# Patient Record
Sex: FEMALE | Race: WHITE | NOT HISPANIC OR LATINO | Employment: OTHER | URBAN - METROPOLITAN AREA
[De-identification: names, ages, dates, MRNs, and addresses within clinical notes are randomized per-mention and may not be internally consistent; named-entity substitution may affect disease eponyms.]

---

## 2022-11-22 ENCOUNTER — TRANSCRIBE ORDERS (OUTPATIENT)
Dept: LAB | Facility: HOSPITAL | Age: 77
End: 2022-11-22

## 2022-11-22 ENCOUNTER — LAB (OUTPATIENT)
Dept: LAB | Facility: HOSPITAL | Age: 77
End: 2022-11-22

## 2022-11-22 DIAGNOSIS — H44.001 RIGHT ENDOPHTHALMIA: ICD-10-CM

## 2022-11-22 DIAGNOSIS — H44.001 RIGHT ENDOPHTHALMIA: Primary | ICD-10-CM

## 2022-11-22 PROCEDURE — 87102 FUNGUS ISOLATION CULTURE: CPT

## 2022-11-22 PROCEDURE — 87205 SMEAR GRAM STAIN: CPT

## 2022-11-22 PROCEDURE — 87206 SMEAR FLUORESCENT/ACID STAI: CPT

## 2022-11-22 PROCEDURE — 87075 CULTR BACTERIA EXCEPT BLOOD: CPT

## 2022-11-22 PROCEDURE — 87070 CULTURE OTHR SPECIMN AEROBIC: CPT

## 2022-11-24 LAB — GIE STN SPEC: NORMAL

## 2022-11-25 LAB
BACTERIA FLD CULT: NORMAL
GRAM STN SPEC: NORMAL
GRAM STN SPEC: NORMAL

## 2022-11-27 LAB — BACTERIA SPEC ANAEROBE CULT: NORMAL

## 2023-01-03 LAB — FUNGUS WND CULT: NORMAL

## 2024-05-29 PROBLEM — E55.9 VITAMIN D DEFICIENCY: Status: ACTIVE | Noted: 2024-05-29

## 2024-05-29 PROBLEM — M25.512 PAIN IN JOINT OF LEFT SHOULDER: Status: ACTIVE | Noted: 2024-05-29

## 2024-05-29 PROBLEM — M18.0 PRIMARY OSTEOARTHRITIS OF BOTH FIRST CARPOMETACARPAL JOINTS: Status: ACTIVE | Noted: 2024-05-29

## 2024-05-29 PROBLEM — R29.3 ABNORMAL POSTURE: Status: ACTIVE | Noted: 2024-05-29

## 2024-05-29 PROBLEM — M85.80 OSTEOPENIA: Status: ACTIVE | Noted: 2024-05-29

## 2024-05-29 PROBLEM — M19.041 PRIMARY OSTEOARTHRITIS OF BOTH HANDS: Status: ACTIVE | Noted: 2024-05-29

## 2024-05-29 PROBLEM — M17.0 PRIMARY OSTEOARTHRITIS OF BOTH KNEES: Status: ACTIVE | Noted: 2024-05-29

## 2024-05-29 PROBLEM — M19.042 PRIMARY OSTEOARTHRITIS OF BOTH HANDS: Status: ACTIVE | Noted: 2024-05-29

## 2024-05-29 NOTE — PROGRESS NOTES
Office Follow Up      Date: 06/03/2024   Patient Name: Wanda Garner  MRN: [id number]  YOB: 1945    Referring Physician: Raudel Jin MD     Chief Complaint: Osteoarthritis      History of Present Illness: Wanda Garner is a 78 y.o. female who is here today for follow up on osteoarthritis.  Overall doing well. Knees are doing well. Pain is low grade, 2/10.  Little shoulder pain. PT and exercise helped.  Mild loss of ROM on R.   Hands are painless but deformities are still slowly worsening.  Non drug holiday from [facility].  Location of the pain is bilateral shoulder, bilateral hand and bilateral knee.  The patient describes it as stiff, tender, dull and achy.  It occurs intermittently.  The problem is stable.  Symptom is aggravated by bending, using hands, grasping objects and weather.  Relieving factors include OTC Meds.  Associated symptoms include gelling phenomenon.  Pertinent negatives include claudication, diarrhea, dyspnea, fatigue, fever, incontinence (fecal), incontinence (urinary), nocturnal pain, rash, weakness and weight loss.    Additional information: No longer triggering of R 4th digit.    Subjective   Review of Systems: Review of Systems   Constitutional:  Negative for chills, fatigue, fever and unexpected weight loss.   HENT:  Negative for dental problem, mouth sores, sinus pressure and swollen glands.    Eyes:  Negative for photophobia, pain and redness.   Respiratory:  Negative for apnea, cough, chest tightness and shortness of breath.    Cardiovascular:  Negative for chest pain and leg swelling.   Gastrointestinal:  Negative for abdominal pain, diarrhea, nausea and GERD.   Genitourinary:  Negative for dysuria and hematuria.   Skin:  Negative for dry skin, rash, skin lesions and bruise.   Neurological:  Negative for dizziness, seizures, syncope, weakness, headache and memory problem.   Hematological:  Negative for adenopathy. Does not bruise/bleed easily.    Psychiatric/Behavioral:  Negative for sleep disturbance, suicidal ideas, depressed mood and stress. The patient is not nervous/anxious.    All other systems reviewed and are negative.       Medications:   Current Outpatient Medications:     busPIRone (BUSPAR) 10 MG tablet, Take 2 tablets by mouth Daily., Disp: , Rfl:     calcium carbonate (OS-SELINA) 600 MG tablet, Take 1 tablet by mouth Daily., Disp: , Rfl:     Cholecalciferol (Vitamin D3 Super Strength) 50 MCG (2000 UT) capsule, Take 1 capsule by mouth Daily., Disp: , Rfl:     cycloSPORINE (RESTASIS) 0.05 % ophthalmic emulsion, Administer 1 drop to both eyes Every 12 (Twelve) Hours., Disp: , Rfl:     Iron-Vit C-Vit B12-Folic Acid (IRON 100 PLUS PO), Take 1 tablet by mouth Daily., Disp: , Rfl:     LORazepam (ATIVAN) 0.5 MG tablet, Take 0.5 tablets by mouth Daily As Needed for Anxiety., Disp: , Rfl:     mirtazapine (REMERON) 30 MG tablet, Take 1 tablet by mouth Every Night., Disp: , Rfl:     Multiple Vitamins-Minerals (PRESERVISION AREDS 2 PO), Take 1 tablet by mouth 2 (Two) Times a Day., Disp: , Rfl:     multivitamin with minerals (MULTIVITAMIN ADULT PO), Take 1 tablet by mouth Daily., Disp: , Rfl:     Omega-3 Fatty Acids (fish oil) 1000 MG capsule capsule, Take 1 capsule by mouth Daily With Breakfast., Disp: , Rfl:     clonazePAM (KlonoPIN) 0.5 MG tablet, , Disp: , Rfl:     erythromycin (ROMYCIN) 5 MG/GM ophthalmic ointment, Administer  to both eyes Every 4 (Four) Hours While Awake., Disp: 3.5 g, Rfl: 0    simvastatin (ZOCOR) 20 MG tablet, Take 1 tablet by mouth Every Night., Disp: , Rfl:     Allergies:   Allergies   Allergen Reactions    Ciprofloxacin Rash    Sulfa Antibiotics Rash       I have reviewed and updated the patient's chief complaint, history of present illness, review of systems, past medical history, surgical history, family history, social history, medications and allergy list as appropriate.     Objective    Vital Signs:   Vitals:    06/03/24 0917  "  BP: 106/64   Pulse: 66   Temp: 97.9 °F (36.6 °C)   Weight: 63.3 kg (139 lb 8 oz)   Height: 160 cm (62.99\")   PainSc:   2   PainLoc: Hand     Body mass index is 24.72 kg/m².    Physical Exam:  GENERAL: The patient is well developed and well nourished.  Cooperative and oriented times three.  Affect is normal.  Hydration appears normal.    HEENT: Normocephalic and atraumatic.  No notable alopecia.  No facial rash.  Lids and conjunctiva are normal.  Pupils are equal and sclera are clear.    NECK: Neck is supple without adenopathy, masses, or thyromegaly.    LUNGS: Effort is normal.  Lungs are clear without rales or rhonchi.    CARDIOVASCULAR: Normal S1, S2.  No murmurs are heard.    ABDOMEN: Soft and nontender without palpable hepatosplenomegaly.  EXTREMITIES: There is no edema.  I see no cyanosis or clubbing.    SKIN: Inspection and palpation are normal.  No rashes are present.    NEUROLOGIC: Gait is normal.  MUSCULOSKELETAL: Complete joint exam was performed.  There is full range of motion of the shoulders, elbows, wrists, and hands without notable deformities, soft tissue swelling, synovitis, or atrophy. L shoulder has limited ROM. There is bunching of biceps tendon on L.  Osteoarthritic changes are seen in the PIP and DIP joints and CMC joints. 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 No active triggering right 3rd finger.  Hips have good flexion and internal and external rotation.  Knees have no palpable effusions.  There is full extension and full flexion of the knees without pain. There is bony prominence.  Crepitus is present.  Ankles have no soft tissue swelling, synovitis, or major deformities.    BACK: Straight without notable scoliosis.    Assessment / Plan      Assessment & Plan  Primary osteoarthritis of both knees  Doing well. Pain is mild  Pt global is 2/10 and VAS is 2/10.   No need for further intervention currently.   I will get labs from PCP.  F/U in 1 year  Primary osteoarthritis of both first carpometacarpal " joints  Doing well. Pain is minimal. She has significant deformity.   She no longer has significant pain in her thumbs.  No need for intervention.  Primary osteoarthritis of both hands  Degenerative pain is mild and low-grade.  She cannot fully close left hand as 5th finger does not fully flex.  She can use Voltaren gel prn as it seems to help.  Osteopenia, unspecified location  On Boniva from PCP for 10 years, stopped 2023 for drug holiday.    10/27/21 scan with lowest T score of -2.7 at femoral neck.   11/1/23 scan with lowest T score of -1.8.   I will give 2 year holiday from Boniva.   Recheck DEXA in 2 years from previous.  Pain in joint of left shoulder  Dr. Palafox injected left shoulder 10/2020.PT helped. Has biceps tendon rupture.   PT and home exercises helped. Minor pain in R shoulder.   Vitamin D deficiency  Continue over-the-counter supplementation.  Abnormal posture  She has kyphosis.  She did not do PT.              Follow Up:   Return in about 1 year (around 6/3/2025).        Andrew Lundberg MD  [facility] [place]

## 2024-05-29 NOTE — ASSESSMENT & PLAN NOTE
On Boniva from PCP for 10 years, stopped 2023 for drug holiday.    10/27/21 scan with lowest T score of -2.7 at femoral neck.   11/1/23 scan with lowest T score of -1.8.   I will give 2 year holiday from [facility] [place].   Recheck DEXA in 2 years from previous.

## 2024-05-29 NOTE — ASSESSMENT & PLAN NOTE
Dr. Palafox injected left shoulder 10/2020.PT helped. Has biceps tendon rupture.   PT and home exercises helped. Minor pain in R shoulder.

## 2024-05-29 NOTE — ASSESSMENT & PLAN NOTE
Doing well. Pain is minimal. She has significant deformity.   She no longer has significant pain in her thumbs.  No need for intervention.

## 2024-05-29 NOTE — ASSESSMENT & PLAN NOTE
Doing well. Pain is mild  Pt global is 2/10 and VAS is 2/10.   No need for further intervention currently.   I will get labs from PCP.  F/U in 1 year

## 2024-05-29 NOTE — ASSESSMENT & PLAN NOTE
Degenerative pain is mild and low-grade.  She cannot fully close left hand as 5th finger does not fully flex.  She can use Voltaren gel prn as it seems to help.

## 2024-06-03 ENCOUNTER — OFFICE VISIT (OUTPATIENT)
Age: 79
End: 2024-06-03
Payer: MEDICARE

## 2024-06-03 VITALS
SYSTOLIC BLOOD PRESSURE: 106 MMHG | DIASTOLIC BLOOD PRESSURE: 64 MMHG | HEIGHT: 63 IN | WEIGHT: 139.5 LBS | HEART RATE: 66 BPM | BODY MASS INDEX: 24.72 KG/M2 | TEMPERATURE: 97.9 F

## 2024-06-03 DIAGNOSIS — M19.041 PRIMARY OSTEOARTHRITIS OF BOTH HANDS: ICD-10-CM

## 2024-06-03 DIAGNOSIS — M18.0 PRIMARY OSTEOARTHRITIS OF BOTH FIRST CARPOMETACARPAL JOINTS: ICD-10-CM

## 2024-06-03 DIAGNOSIS — M25.512 PAIN IN JOINT OF LEFT SHOULDER: ICD-10-CM

## 2024-06-03 DIAGNOSIS — E55.9 VITAMIN D DEFICIENCY: ICD-10-CM

## 2024-06-03 DIAGNOSIS — M85.80 OSTEOPENIA, UNSPECIFIED LOCATION: ICD-10-CM

## 2024-06-03 DIAGNOSIS — R29.3 ABNORMAL POSTURE: ICD-10-CM

## 2024-06-03 DIAGNOSIS — M19.042 PRIMARY OSTEOARTHRITIS OF BOTH HANDS: ICD-10-CM

## 2024-06-03 DIAGNOSIS — M17.0 PRIMARY OSTEOARTHRITIS OF BOTH KNEES: Primary | ICD-10-CM

## 2024-06-03 RX ORDER — CHOLECALCIFEROL (VITAMIN D3) 50 MCG
1 CAPSULE ORAL DAILY
COMMUNITY

## 2025-02-13 ENCOUNTER — TELEPHONE (OUTPATIENT)
Age: 80
End: 2025-02-13
Payer: MEDICARE

## 2025-02-13 NOTE — TELEPHONE ENCOUNTER
JUAN MANUELOM advising she won't be due again until November of this year so she won't be due for one at the time of her appt.     OKAY FOR HUB TO RELAY

## 2025-02-13 NOTE — TELEPHONE ENCOUNTER
Caller: Wanda Garner    Relationship: Self    Best call back number: [phone]    What orders are you requesting (i.e. lab or imaging): DEXA    In what timeframe would the patient need to come in: SAME DAY AS APPOINTMENT ON 6/4/25 W/ DR. GARCIA    Where will you receive your lab/imaging services: [facility]    Additional notes: PATIENT WAS NOT SURE IF SHE WAS DUE FOR DEXA SCAN, IF SO SHE WOULD LIKE TO HAVE ONE DONE THE SAME DAY AS HER APPOINTMENT WITH DR. GARCIA. LAST DEXA SCAN WAS DONE IN 2023.  PLEASE ADVISE.

## 2025-06-03 NOTE — ASSESSMENT & PLAN NOTE
Doing well. Pain is minimal. She has significant deformity.   She no longer has significant pain in her thumbs.  We discussed injections, splinting, and surgery.  She feels there is no need for intervention.

## 2025-06-03 NOTE — PROGRESS NOTES
Office Follow Up      Date: 06/04/2025   Patient Name: Wanda Garner  MRN: [id number]  YOB: 1945    Referring Physician: No ref. provider found     Chief Complaint: Osteoarthritis      History of Present Illness: Wanda Garner is a 79 y.o. female who is here today for follow up on osteoarthritis.  Currently has L Achilles tendonitis. Now in walking boot.   Overall doing well. Knees are doing well. Pain is low grade, 7/10, driven by Achilles tendonitis. .  Little shoulder pain. PT and exercise helped.  Mild loss of ROM on R.   Hands are stable with minimal pain but deformities are still slowly worsening.  On drug holiday from [facility]. Dexa due after 11/25  Location of the pain is bilateral shoulder, bilateral hand and bilateral knee.  The patient describes it as stiff, tender, dull and achy.  It occurs intermittently.  The problem is stable.  Symptom is aggravated by bending, using hands, grasping objects and weather.  Relieving factors include OTC Meds.  Associated symptoms include gelling phenomenon.  Pertinent negatives include claudication, diarrhea, dyspnea, fatigue, fever, incontinence (fecal), incontinence (urinary), nocturnal pain, rash, weakness and weight loss.    Additional information: No longer triggering of R 4th digit.  Has been well otherwise.     Subjective   Review of Systems: Review of Systems   Constitutional:  Negative for chills, fatigue, fever and unexpected weight loss.   HENT:  Negative for dental problem, mouth sores, sinus pressure and swollen glands.    Eyes:  Negative for photophobia, pain and redness.   Respiratory:  Negative for apnea, cough, chest tightness and shortness of breath.    Cardiovascular:  Negative for chest pain and leg swelling.   Gastrointestinal:  Negative for abdominal pain, diarrhea, nausea and GERD.   Genitourinary:  Negative for dysuria and hematuria.   Skin:  Negative for dry skin, rash, skin lesions and bruise.   Neurological:  Negative  for dizziness, seizures, syncope, weakness, headache and memory problem.   Hematological:  Negative for adenopathy. Does not bruise/bleed easily.   Psychiatric/Behavioral:  Negative for sleep disturbance, suicidal ideas, depressed mood and stress. The patient is not nervous/anxious.    All other systems reviewed and are negative.       Medications:   Current Outpatient Medications:     busPIRone (BUSPAR) 10 MG tablet, Take 2 tablets by mouth Daily., Disp: , Rfl:     calcium carbonate (OS-SELINA) 600 MG tablet, Take 1 tablet by mouth Daily., Disp: , Rfl:     Cholecalciferol (Vitamin D3 Super Strength) 50 MCG (2000 UT) capsule, Take 1 capsule by mouth Daily., Disp: , Rfl:     cycloSPORINE (RESTASIS) 0.05 % ophthalmic emulsion, Administer 1 drop to both eyes Every 12 (Twelve) Hours., Disp: , Rfl:     Iron-Vit C-Vit B12-Folic Acid (IRON 100 PLUS PO), Take 1 tablet by mouth Daily., Disp: , Rfl:     mirtazapine (REMERON) 30 MG tablet, Take 1 tablet by mouth Every Night., Disp: , Rfl:     Multiple Vitamins-Minerals (PRESERVISION AREDS 2 PO), Take 1 tablet by mouth 2 (Two) Times a Day., Disp: , Rfl:     multivitamin with minerals (MULTIVITAMIN ADULT PO), Take 1 tablet by mouth Daily., Disp: , Rfl:     Omega-3 Fatty Acids (fish oil) 1000 MG capsule capsule, Take 1 capsule by mouth Daily With Breakfast., Disp: , Rfl:     simvastatin (ZOCOR) 20 MG tablet, Take 1 tablet by mouth Every Night., Disp: , Rfl:     clonazePAM (KlonoPIN) 0.5 MG tablet, , Disp: , Rfl:     erythromycin (ROMYCIN) 5 MG/GM ophthalmic ointment, Administer  to both eyes Every 4 (Four) Hours While Awake. (Patient not taking: Reported on 6/4/2025), Disp: 3.5 g, Rfl: 0    LORazepam (ATIVAN) 0.5 MG tablet, Take 0.5 tablets by mouth Daily As Needed for Anxiety. (Patient not taking: Reported on 6/4/2025), Disp: , Rfl:     Allergies:   Allergies   Allergen Reactions    Ciprofloxacin Rash    Sulfa Antibiotics Rash       I have reviewed and updated the patient's chief  complaint, history of present illness, review of systems, past medical history, surgical history, family history, social history, medications and allergy list as appropriate.     Objective    Vital Signs:   Vitals:    06/04/25 0951   BP: 120/60   Pulse: 67   Temp: 97.5 °F (36.4 °C)   PainSc: 7        There is no height or weight on file to calculate BMI.    Physical Exam:  GENERAL: The patient is well developed and well nourished.  Cooperative and oriented times three.  Affect is normal.  Hydration appears normal.    HEENT: Normocephalic and atraumatic.  No notable alopecia.  No facial rash.  Lids and conjunctiva are normal.  Pupils are equal and sclera are clear.    NECK: Neck is supple without adenopathy, masses, or thyromegaly.    LUNGS: Effort is normal.  Lungs are clear without rales or rhonchi.    CARDIOVASCULAR: Normal S1, S2.  No murmurs are heard.    ABDOMEN: Soft and nontender without palpable hepatosplenomegaly.  EXTREMITIES: There is no edema.  I see no cyanosis or clubbing.    SKIN: Inspection and palpation are normal.  No rashes are present.    NEUROLOGIC: Gait is normal.  MUSCULOSKELETAL: Complete joint exam was performed.  There is full range of motion of the shoulders, elbows, wrists, and hands without notable deformities, soft tissue swelling, synovitis, or atrophy. L shoulder has limited ROM. There is bunching of biceps tendon on L.  Osteoarthritic changes are seen in the PIP and DIP joints and CMC joints. 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 No active triggering right 3rd finger.  Hips have good flexion and internal and external rotation.  Knees have no palpable effusions.  There is full extension and full flexion of the knees without pain. There is bony prominence.  Crepitus is present.  Ankles have no soft tissue swelling, synovitis, or major deformities.    BACK: Straight without notable scoliosis.    Assessment / Plan      Assessment & Plan  Primary osteoarthritis of both knees  Stable. . Pain is  mild. Scores are elevated due to Achilles tendonitis. She is seeing podiatry for this and has a boot.   Pt global is 5/10 and VAS is 6/10.   No need for further intervention currently.   I will get labs from PCP.  F/U in 6 months to get DEXA.   Primary osteoarthritis of both first carpometacarpal joints  Doing well. Pain is minimal. She has significant deformity.   She no longer has significant pain in her thumbs.  We discussed injections, splinting, and surgery.  She feels there is no need for intervention.  Primary osteoarthritis of both hands  Degenerative pain is mild and low-grade.  She cannot fully close left hand as 5th finger does not fully flex.  We discussed that surgical intervention would probably not be effective.  She can use Voltaren gel prn as it seems to help.  Osteopenia, unspecified location  On Boniva from PCP for 10 years, stopped 2023 for drug holiday.    10/27/21 scan with lowest T score of -2.7 at femoral neck.   11/1/23 scan with lowest T score of -1.8.   She is on a 2 year holiday from [facility].   Recheck DEXA in 2 years from previous. F/u will be in 12/25 and we can recheck it then.   Pain in joint of left shoulder  Dr. Palafox injected left shoulder 10/2020.PT helped. Has biceps tendon rupture.   PT and home exercises helped. Minor pain in R shoulder.   Vitamin D deficiency  Continue over-the-counter supplementation.  Abnormal posture  She has kyphosis.  She did not do PT.  She can at any point.              Follow Up:   Return in about 6 months (around 12/4/2025).        Andrew Lundberg MD  [facility] [place]

## 2025-06-03 NOTE — ASSESSMENT & PLAN NOTE
Stable. . Pain is mild. Scores are elevated due to Achilles tendonitis. She is seeing podiatry for this and has a boot.   Pt global is 5/10 and VAS is 6/10.   No need for further intervention currently.   I will get labs from PCP.  F/U in 6 months to get DEXA.

## 2025-06-03 NOTE — ASSESSMENT & PLAN NOTE
On Boniva from PCP for 10 years, stopped 2023 for drug holiday.    10/27/21 scan with lowest T score of -2.7 at femoral neck.   11/1/23 scan with lowest T score of -1.8.   She is on a 2 year holiday from Boniva.   Recheck DEXA in 2 years from previous. F/u will be in 12/25 and we can recheck it then.

## 2025-06-03 NOTE — ASSESSMENT & PLAN NOTE
Degenerative pain is mild and low-grade.  She cannot fully close left hand as 5th finger does not fully flex.  We discussed that surgical intervention would probably not be effective.  She can use Voltaren gel prn as it seems to help.

## 2025-06-04 ENCOUNTER — OFFICE VISIT (OUTPATIENT)
Age: 80
End: 2025-06-04
Payer: MEDICARE

## 2025-06-04 VITALS — HEART RATE: 67 BPM | DIASTOLIC BLOOD PRESSURE: 60 MMHG | TEMPERATURE: 97.5 F | SYSTOLIC BLOOD PRESSURE: 120 MMHG

## 2025-06-04 DIAGNOSIS — M19.042 PRIMARY OSTEOARTHRITIS OF BOTH HANDS: ICD-10-CM

## 2025-06-04 DIAGNOSIS — M17.0 PRIMARY OSTEOARTHRITIS OF BOTH KNEES: Primary | ICD-10-CM

## 2025-06-04 DIAGNOSIS — M19.041 PRIMARY OSTEOARTHRITIS OF BOTH HANDS: ICD-10-CM

## 2025-06-04 DIAGNOSIS — M85.80 OSTEOPENIA, UNSPECIFIED LOCATION: ICD-10-CM

## 2025-06-04 DIAGNOSIS — M25.512 PAIN IN JOINT OF LEFT SHOULDER: ICD-10-CM

## 2025-06-04 DIAGNOSIS — M18.0 PRIMARY OSTEOARTHRITIS OF BOTH FIRST CARPOMETACARPAL JOINTS: ICD-10-CM

## 2025-06-04 DIAGNOSIS — E55.9 VITAMIN D DEFICIENCY: ICD-10-CM

## 2025-06-04 DIAGNOSIS — R29.3 ABNORMAL POSTURE: ICD-10-CM

## 2025-06-06 ENCOUNTER — TELEPHONE (OUTPATIENT)
Age: 80
End: 2025-06-06
Payer: MEDICARE

## 2025-06-06 DIAGNOSIS — Z78.0 POST-MENOPAUSE: Primary | ICD-10-CM

## 2025-06-06 NOTE — TELEPHONE ENCOUNTER
Caller: Wanda Garner    Relationship to patient: Self    Best call back number: [phone]    Patient is needing: PT WAS WAITING ON A CALL BACK TO SCHEDULE HER DEXA SCAN BUT HADN'T HEARD BACK YET. PLEASE ADVISE.

## 2025-06-09 NOTE — TELEPHONE ENCOUNTER
PATIENT IS WANTING TO GET HER DEXA SCHEDULED.  SHE WILL NOT BE DUE AFTER 11/01/25.      THE ORDER WILL NEED TO BE CLINIC PERFORMED IF SHE IS GETTING AT OUR OFFICE.      LVM FOR HER TO RETURN MY CALL.    THERE ISN'T AN OPENING RIGHT BEFORE HER APPT.  AS SOON AS THE ORDER IS PUT IT I WILL ADJUST APPT TIME SO THE WE CAN GET THE DEXA SCHEDULED BEFORE HER APPT.

## 2025-06-11 NOTE — TELEPHONE ENCOUNTER
THIS WAS SCHEDULED.  INFORMED PATIENT THAT THERE MAY BE AN INCREASED COST AT OUR FACILITY DUE TO BEING OUT OF NETWORK.  SHE IS GOING TO CALL HER INSURANCE AND CHECK IT OUT.

## 2025-07-07 ENCOUNTER — TELEPHONE (OUTPATIENT)
Age: 80
End: 2025-07-07
Payer: MEDICARE

## 2025-07-07 NOTE — TELEPHONE ENCOUNTER
I called pt and she said she would like to get in with JSN if she could, but would see a NP if he didn't have anything, but really didn't want to wait until the October appt. I offered her an appt with SCOTT on 7/10/25 @ 230.  She accepted.  I cx the appt in Oct with Rosemary.  Pt said she'd go ahead and have her labs done so he'd have those as well. -Eleni Lopez, RMA

## 2025-07-07 NOTE — TELEPHONE ENCOUNTER
I spoke with pt again. She has a copy of the MRI disk and the report that was given to her.  She is going to have labs done today and will tell the to fax to JSN as she has appt with him on Thursday.  I advised her to bring a hard copy of labs with her just in case they are not in chart at time of visit. She understood. -Eleni Lopez, SULEMAN

## 2025-07-07 NOTE — TELEPHONE ENCOUNTER
Caller: Wanda Garner    Relationship: Self    Best call back number: [phone]    What is the best time to reach you: ANYTIME    Who are you requesting to speak with (clinical staff, provider,  specific staff member): CLINICAL    Do you know the name of the person who called: PATIENT    What was the call regarding: PT JUST SAW DR. GARCIA IN JUNE. AT THAT TIME SHE HAD A BOOT ON HER LEG. DURING A RECENT MRI, IT WAS DETERMINED THAT IT IS A RHEUM. AUTO-IMMUNE ISSUES WITH THE ACHILLES TENDON.  PT WANTS TO BE SEEN A LOT EARLIER THAN OCTOBER AS SHE HAS BEEN TRYING TO GET THIS RESOLVED FOR A GOOD WHILE NOW FINDING OUT IT'S A RHEUM ISSUE.  1ST AVAIL HUB COULD FIND WAS IN OCTOBER.  CAN YOU GET HER IN EARLIER?  PLEASE ADVISE PT.

## 2025-07-08 ENCOUNTER — TELEPHONE (OUTPATIENT)
Age: 80
End: 2025-07-08
Payer: MEDICARE

## 2025-07-09 NOTE — ASSESSMENT & PLAN NOTE
On Boniva from PCP for 10 years, stopped 2023 for drug holiday.    10/27/21 scan with lowest T score of -2.7 at femoral neck.   11/1/23 scan with lowest T score of -1.8.   She is on a 2 year holiday from Boniva.   Recheck DEXA in 2 years from previous. F/u will be in 12/25 and we can recheck it then.  It has been scheduled.

## 2025-07-09 NOTE — ASSESSMENT & PLAN NOTE
Doing well.  Pain is mild.   Scores are elevated due to Achilles tendonitis.   Pt global is 53/10 and VAS is 6/10.   No need for further intervention currently.   I will get labs from PCP.  No NSAIDs due to h/o GI bleed.   F/U in 5 months to get DEXA.

## 2025-07-09 NOTE — PROGRESS NOTES
"                     Office Follow Up      Date: 07/10/2025   Patient Name: Wanda Garner  MRN: [id number]  YOB: 1945    Referring Physician: No ref. provider found     Chief Complaint: Osteoarthritis      History of Present Illness: Wanda Garner is a 79 y.o. female who is here today for follow up on osteoarthritis.  Currently has L Achilles tendonitis. Had MRI showing enthesitis. Felt to be due to overuse vs seronegative spondyloarthropathy.   Told to come here for \"autoimmune evaluation\".   Knees are doing well. No other joint complaints.   No psoriasis. No back pain. No IBD symptoms. No iritis.   Pain is 6/10, driven by Achilles tendonitis. .  Little shoulder pain. PT and exercise helped.  Mild loss of ROM on R.   Hands are stable with minimal pain but deformities are still slowly worsening.  On drug holiday from [facility]. Dexa due after 11/25  Location of the pain is bilateral shoulder, bilateral hand and bilateral knee.  The patient describes it as stiff, tender, dull and achy.  It occurs intermittently.  The problem is stable.  Symptom is aggravated by bending, using hands, grasping objects and weather.  Relieving factors include OTC Meds.  Associated symptoms include gelling phenomenon.  Pertinent negatives include claudication, diarrhea, dyspnea, fatigue, fever, incontinence (fecal), incontinence (urinary), nocturnal pain, rash, weakness and weight loss.    Has been well otherwise.     Subjective   Review of Systems: Review of Systems   Constitutional:  Negative for chills, fatigue, fever and unexpected weight loss.   HENT:  Negative for dental problem, mouth sores, sinus pressure and swollen glands.    Eyes:  Negative for photophobia, pain and redness.   Respiratory:  Negative for apnea, cough, chest tightness and shortness of breath.    Cardiovascular:  Negative for chest pain and leg swelling.   Gastrointestinal:  Negative for abdominal pain, diarrhea, nausea and GERD.   Genitourinary:  Negative for " dysuria and hematuria.   Musculoskeletal:  Positive for arthralgias and gait problem.   Skin:  Negative for dry skin, rash, skin lesions and bruise.   Neurological:  Negative for dizziness, seizures, syncope, weakness, headache and memory problem.   Hematological:  Negative for adenopathy. Does not bruise/bleed easily.   Psychiatric/Behavioral:  Negative for sleep disturbance, suicidal ideas, depressed mood and stress. The patient is not nervous/anxious.    All other systems reviewed and are negative.       Medications:   Current Outpatient Medications:     busPIRone (BUSPAR) 10 MG tablet, Take 2 tablets by mouth Daily., Disp: , Rfl:     calcium carbonate (OS-SELINA) 600 MG tablet, Take 1 tablet by mouth Daily., Disp: , Rfl:     Cholecalciferol (Vitamin D3 Super Strength) 50 MCG (2000 UT) capsule, Take 1 capsule by mouth Daily., Disp: , Rfl:     cycloSPORINE (RESTASIS) 0.05 % ophthalmic emulsion, Administer 1 drop to both eyes Every 12 (Twelve) Hours., Disp: , Rfl:     Iron-Vit C-Vit B12-Folic Acid (IRON 100 PLUS PO), Take 1 tablet by mouth Daily., Disp: , Rfl:     mirtazapine (REMERON) 30 MG tablet, Take 1 tablet by mouth Every Night., Disp: , Rfl:     Multiple Vitamins-Minerals (PRESERVISION AREDS 2 PO), Take 1 tablet by mouth 2 (Two) Times a Day., Disp: , Rfl:     multivitamin with minerals (MULTIVITAMIN ADULT PO), Take 1 tablet by mouth Daily., Disp: , Rfl:     Omega-3 Fatty Acids (fish oil) 1000 MG capsule capsule, Take 1 capsule by mouth Daily With Breakfast., Disp: , Rfl:     simvastatin (ZOCOR) 20 MG tablet, Take 1 tablet by mouth Every Night., Disp: , Rfl:     Allergies:   Allergies   Allergen Reactions    Ciprofloxacin Rash    Sulfa Antibiotics Rash       I have reviewed and updated the patient's chief complaint, history of present illness, review of systems, past medical history, surgical history, family history, social history, medications and allergy list as appropriate.     Objective    Vital Signs:  "  Vitals:    07/10/25 1428   BP: 144/78   BP Location: Left arm   Patient Position: Sitting   Cuff Size: Adult   Pulse: 55   Temp: 97.3 °F (36.3 °C)   Weight: 67.9 kg (149 lb 9.6 oz)   Height: 160 cm (63\")   PainSc: 6          Body mass index is 26.5 kg/m².    Physical Exam:  GENERAL: The patient is well developed and well nourished.  Cooperative and oriented times three.  Affect is normal.  Hydration appears normal.    HEENT: Normocephalic and atraumatic.  No notable alopecia.  No facial rash.  Lids and conjunctiva are normal.  Pupils are equal and sclera are clear.    NECK: Neck is supple without adenopathy, masses, or thyromegaly.    LUNGS: Effort is normal.  Lungs are clear without rales or rhonchi.    CARDIOVASCULAR: Normal S1, S2.  No murmurs are heard.    ABDOMEN: Soft and nontender without palpable hepatosplenomegaly.  EXTREMITIES: There is 1-2+ edema.  I see no cyanosis or clubbing.    SKIN: Inspection and palpation are normal.  No rashes are present.    NEUROLOGIC: Gait is normal.  MUSCULOSKELETAL: Complete joint exam was performed.  There is full range of motion of the shoulders, elbows, wrists, and hands without notable deformities, soft tissue swelling, synovitis, or atrophy. L shoulder has limited ROM. There is bunching of biceps tendon on L.  Osteoarthritic changes are seen in the PIP and DIP joints and CMC joints. 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 No active triggering right 3rd finger.  Hips have good flexion and internal and external rotation.  Knees have no palpable effusions.  There is full extension and full flexion of the knees without pain. There is bony prominence.  Crepitus is present.  Ankles have no soft tissue swelling, synovitis, or major deformities.  She does have edema in her ankles.  She has degenerative changes in the ankles and midfoot.  There is tenderness at the insertion of the left Achilles tendon.  BACK: Straight without notable scoliosis.    Assessment / Plan      Assessment & " Plan  Achilles tendinitis of left lower extremity  Chronic pain at the insertion of the left Achilles tendon.  MRI showed enthesitis, radiology report suggested either overuse versus seronegative spondyloarthropathy.  Rheumatoid factor, HLA-B27, KATHERINE, sedimentation rate, and CRP have been ordered and are pending.  If there is evidence of autoimmune inflammatory disease, we will perform further workup.  This seems unlikely because she has no other joint involvement.  She has no history of psoriasis, iritis, back pain, or inflammatory bowel disease.  If there is no evidence of systemic autoimmune inflammatory disease, we will refer to an orthopedic foot specialist.  I will see her as per her previous appointment.  Primary osteoarthritis of both knees  Doing well.  Pain is mild.   Scores are elevated due to Achilles tendonitis.   Pt global is 53/10 and VAS is 6/10.   No need for further intervention currently.   I will get labs from PCP.  No NSAIDs due to h/o GI bleed.   F/U in 5 months to get DEXA.   Primary osteoarthritis of both first carpometacarpal joints  Doing well. Pain is minimal. She has significant deformity.   She no longer has significant pain in her thumbs.  We discussed injections, splinting, and surgery.  She feels there is no need for intervention.  Primary osteoarthritis of both hands  Degenerative pain is mild and low-grade.  She cannot fully close left hand as 5th finger does not fully flex.  We discussed that surgical intervention would probably not be effective.  She can use Voltaren gel prn as it seems to help.  Osteopenia, unspecified location  On [employer] from PCP for 10 years, stopped 2023 for drug holiday.    10/27/21 scan with lowest T score of -2.7 at femoral neck.   11/1/23 scan with lowest T score of -1.8.   She is on a 2 year holiday from [place] [employer].   Recheck DEXA in 2 years from previous. F/u will be in 12/25 and we can recheck it then.  It has been scheduled.  Pain in joint of left  shoulder  Dr. Palafox injected left shoulder 10/2020.PT helped. Has biceps tendon rupture.   PT and home exercises helped. Minor pain in R shoulder.   Vitamin D deficiency  Continue over-the-counter supplementation.  Abnormal posture  She has kyphosis.  She did not do PT.  She can at any point.   Post-menopause               Follow Up:   Return in about 4 months (around 11/10/2025).        Andrew Lundberg MD  [facility]

## 2025-07-10 ENCOUNTER — TELEPHONE (OUTPATIENT)
Age: 80
End: 2025-07-10

## 2025-07-10 ENCOUNTER — OFFICE VISIT (OUTPATIENT)
Age: 80
End: 2025-07-10
Payer: MEDICARE

## 2025-07-10 VITALS
SYSTOLIC BLOOD PRESSURE: 144 MMHG | HEIGHT: 63 IN | BODY MASS INDEX: 26.51 KG/M2 | DIASTOLIC BLOOD PRESSURE: 78 MMHG | WEIGHT: 149.6 LBS | HEART RATE: 55 BPM | TEMPERATURE: 97.3 F

## 2025-07-10 DIAGNOSIS — M17.0 PRIMARY OSTEOARTHRITIS OF BOTH KNEES: Primary | ICD-10-CM

## 2025-07-10 DIAGNOSIS — E55.9 VITAMIN D DEFICIENCY: ICD-10-CM

## 2025-07-10 DIAGNOSIS — R29.3 ABNORMAL POSTURE: ICD-10-CM

## 2025-07-10 DIAGNOSIS — M18.0 PRIMARY OSTEOARTHRITIS OF BOTH FIRST CARPOMETACARPAL JOINTS: ICD-10-CM

## 2025-07-10 DIAGNOSIS — M19.042 PRIMARY OSTEOARTHRITIS OF BOTH HANDS: ICD-10-CM

## 2025-07-10 DIAGNOSIS — M19.041 PRIMARY OSTEOARTHRITIS OF BOTH HANDS: ICD-10-CM

## 2025-07-10 DIAGNOSIS — M85.80 OSTEOPENIA, UNSPECIFIED LOCATION: ICD-10-CM

## 2025-07-10 DIAGNOSIS — M76.62 ACHILLES TENDINITIS OF LEFT LOWER EXTREMITY: ICD-10-CM

## 2025-07-10 DIAGNOSIS — Z78.0 POST-MENOPAUSE: ICD-10-CM

## 2025-07-10 DIAGNOSIS — M25.512 PAIN IN JOINT OF LEFT SHOULDER: ICD-10-CM

## 2025-07-10 NOTE — ASSESSMENT & PLAN NOTE
Chronic pain at the insertion of the left Achilles tendon.  MRI showed enthesitis, radiology report suggested either overuse versus seronegative spondyloarthropathy.  Rheumatoid factor, HLA-B27, KATHERINE, sedimentation rate, and CRP have been ordered and are pending.  If there is evidence of autoimmune inflammatory disease, we will perform further workup.  This seems unlikely because she has no other joint involvement.  She has no history of psoriasis, iritis, back pain, or inflammatory bowel disease.  If there is no evidence of systemic autoimmune inflammatory disease, we will refer to an orthopedic foot specialist.  I will see her as per her previous appointment.

## 2025-07-11 NOTE — TELEPHONE ENCOUNTER
Labs from Dr. Roslyn Mccormack pulled from [employer] portal drawn on 7/7. HLA B27 result is still pending.

## 2025-07-30 ENCOUNTER — TELEPHONE (OUTPATIENT)
Age: 80
End: 2025-07-30
Payer: MEDICARE

## 2025-07-30 NOTE — TELEPHONE ENCOUNTER
Please get the result of her HLA-B27 from [facility].  This was ordered by her podiatrist and performed on 7/7.

## 2025-07-31 NOTE — TELEPHONE ENCOUNTER
[facility] Khushbo[facility].  She is faxing results to us. -SOL LariosA

## 2025-08-04 ENCOUNTER — OFFICE VISIT (OUTPATIENT)
Age: 80
End: 2025-08-04
Payer: MEDICARE

## 2025-08-04 VITALS
BODY MASS INDEX: 25.87 KG/M2 | TEMPERATURE: 96.6 F | SYSTOLIC BLOOD PRESSURE: 142 MMHG | HEART RATE: 72 BPM | RESPIRATION RATE: 16 BRPM | WEIGHT: 146 LBS | DIASTOLIC BLOOD PRESSURE: 76 MMHG | HEIGHT: 63 IN

## 2025-08-04 DIAGNOSIS — M19.042 PRIMARY OSTEOARTHRITIS OF BOTH HANDS: ICD-10-CM

## 2025-08-04 DIAGNOSIS — M17.0 PRIMARY OSTEOARTHRITIS OF BOTH KNEES: ICD-10-CM

## 2025-08-04 DIAGNOSIS — M18.0 PRIMARY OSTEOARTHRITIS OF BOTH FIRST CARPOMETACARPAL JOINTS: ICD-10-CM

## 2025-08-04 DIAGNOSIS — R29.3 ABNORMAL POSTURE: ICD-10-CM

## 2025-08-04 DIAGNOSIS — M25.512 PAIN IN JOINT OF LEFT SHOULDER: ICD-10-CM

## 2025-08-04 DIAGNOSIS — M76.62 ACHILLES TENDINITIS OF LEFT LOWER EXTREMITY: Primary | ICD-10-CM

## 2025-08-04 DIAGNOSIS — M19.041 PRIMARY OSTEOARTHRITIS OF BOTH HANDS: ICD-10-CM

## 2025-08-04 DIAGNOSIS — M85.80 OSTEOPENIA, UNSPECIFIED LOCATION: ICD-10-CM

## 2025-08-04 DIAGNOSIS — E55.9 VITAMIN D DEFICIENCY: ICD-10-CM

## 2025-08-04 RX ORDER — CLOTRIMAZOLE AND BETAMETHASONE DIPROPIONATE 10; .64 MG/G; MG/G
CREAM TOPICAL
COMMUNITY
Start: 2025-07-24

## 2025-08-04 RX ORDER — SENNOSIDES 8.6 MG
650 CAPSULE ORAL EVERY 8 HOURS PRN
COMMUNITY